# Patient Record
Sex: FEMALE | Race: WHITE
[De-identification: names, ages, dates, MRNs, and addresses within clinical notes are randomized per-mention and may not be internally consistent; named-entity substitution may affect disease eponyms.]

---

## 2018-10-05 ENCOUNTER — HOSPITAL ENCOUNTER (EMERGENCY)
Dept: HOSPITAL 14 - H.ER | Age: 53
Discharge: HOME | End: 2018-10-05
Payer: MEDICAID

## 2018-10-05 VITALS
TEMPERATURE: 98.2 F | HEART RATE: 54 BPM | RESPIRATION RATE: 16 BRPM | SYSTOLIC BLOOD PRESSURE: 126 MMHG | OXYGEN SATURATION: 99 % | DIASTOLIC BLOOD PRESSURE: 78 MMHG

## 2018-10-05 DIAGNOSIS — N39.0: Primary | ICD-10-CM

## 2018-10-05 LAB
BACTERIA #/AREA URNS HPF: (no result) /[HPF]
BILIRUB UR-MCNC: NEGATIVE MG/DL
COLOR UR: (no result)
GLUCOSE UR STRIP-MCNC: (no result) MG/DL
LEUKOCYTE ESTERASE UR-ACNC: (no result) LEU/UL
PH UR STRIP: 7 [PH] (ref 5–8)
PROT UR STRIP-MCNC: 30 MG/DL
RBC # UR STRIP: (no result) /UL
SP GR UR STRIP: 1.01 (ref 1–1.03)
SQUAMOUS EPITHIAL: 2 /HPF (ref 0–5)
URINE CLARITY: (no result)
UROBILINOGEN UR-MCNC: 4 MG/DL (ref 0.2–1)
WBC CLUMPS # UR AUTO: (no result) /HPF

## 2018-10-05 NOTE — ED PDOC
HPI: Female  Pain


Time Seen by Provider: 10/05/18 19:16


Chief Complaint (Nursing): Female Genitourinary


Chief Complaint (Provider): DYSURIA


History Per: Patient (54 Y/O FEMALE HERE WITH DYSURIA/URINARY 

FREQUENCY/HESITANCY/BURNING WITH URINATION NOTED X 3 DAYS.  DENIES ANY 

FEVERS/CHILLS. NO PRIOR UTI. NO BACK PAIN. NOTES MILD SUPRAPUBIC PAIN WITH 

URINARY EFFORT.)





Past Medical History


Reviewed: Historical Data, Nursing Documentation, Vital Signs


Vital Signs: 





                                Last Vital Signs











Temp  98.2 F   10/05/18 18:56


 


Pulse  54 L  10/05/18 18:56


 


Resp  16   10/05/18 18:56


 


BP  126/78   10/05/18 18:56


 


Pulse Ox  99   10/05/18 18:56














- Medical History


PMH: Gall Bladder Disease, Hypothyroidism





- Surgical History


Surgical History: Cholecystectomy





- Family History


Family History: States: No Known Family Hx





- Home Medications


Home Medications: 


                                Ambulatory Orders











 Medication  Instructions  Recorded


 


Oxycodone HCl/Acetaminophen 1 tab PO Q6 PRN #12 tab 05/30/15





[Percocet 325 mg-5 mg]  


 


Cephalexin [Keflex] 500 mg PO TID #15 capsule 10/05/18


 


Phenazopyridine HCl [Pyridium] 200 mg PO Q12 PRN #6 tablet 10/05/18














- Allergies


Allergies/Adverse Reactions: 


                                    Allergies











Allergy/AdvReac Type Severity Reaction Status Date / Time


 


No Known Allergies Allergy   Verified 10/05/18 18:56














Review of Systems


ROS Statement: Except As Marked, All Systems Reviewed And Found Negative





Physical Exam





- Reviewed


Nursing Documentation Reviewed: Yes


Vital Signs Reviewed: Yes





- Physical Exam


Appears: Positive for: Well, Non-toxic, No Acute Distress


Head Exam: Positive for: ATRAUMATIC, NORMAL INSPECTION, NORMOCEPHALIC


Skin: Positive for: Normal Color, Warm, DRY


Eye Exam: Positive for: EOMI, Normal appearance, PERRL


ENT: Positive for: Normal ENT Inspection


Neck: Positive for: Normal, Painless ROM


Cardiovascular/Chest: Positive for: Regular Rate, Rhythm


Respiratory: Positive for: CNT, Normal Breath Sounds


Gastrointestinal/Abdominal: Positive for: Normal Exam, Soft


Back: Positive for: Normal Inspection


Extremity: Positive for: Normal ROM


Neurologic/Psych: Positive for: Alert, Oriented





- Laboratory Results


Urine dip results: Positive for: Leukocyte Esterase, Blood, Nitrate.  Negative 

for: Ketones, Glucose, Bilirubin, Protein





- ECG


O2 Sat by Pulse Oximetry: 99





Disposition





- Clinical Impression


Clinical Impression: 


 Urinary tract infection








- Patient ED Disposition


Is Patient to be Admitted: No





- Disposition


Referrals: 


[facility] [Outside]


Disposition: Routine/Home


Disposition Time: 19:30


Condition: FAIR


Prescriptions: 


Cephalexin [Keflex] 500 mg PO TID #15 capsule


Phenazopyridine HCl [Pyridium] 200 mg PO Q12 PRN #6 tablet


 PRN Reason: Urinary Discomt


Instructions:  Urinary Tract Infections in Adults, Urinary Tract Infection, 

Adult (DC)


Forms:  [facility] ED School/Work Excuse


Print Language: Faroese